# Patient Record
Sex: MALE | Race: WHITE | NOT HISPANIC OR LATINO | URBAN - METROPOLITAN AREA
[De-identification: names, ages, dates, MRNs, and addresses within clinical notes are randomized per-mention and may not be internally consistent; named-entity substitution may affect disease eponyms.]

---

## 2024-10-24 ENCOUNTER — EMERGENCY (EMERGENCY)
Facility: HOSPITAL | Age: 39
LOS: 1 days | Discharge: HOME | End: 2024-10-24
Admitting: EMERGENCY MEDICINE
Payer: SELF-PAY

## 2024-10-24 VITALS
OXYGEN SATURATION: 97 % | HEART RATE: 110 BPM | RESPIRATION RATE: 18 BRPM | SYSTOLIC BLOOD PRESSURE: 134 MMHG | WEIGHT: 179.9 LBS | TEMPERATURE: 97 F | HEIGHT: 69 IN | DIASTOLIC BLOOD PRESSURE: 93 MMHG

## 2024-10-24 VITALS — HEART RATE: 88 BPM

## 2024-10-24 PROCEDURE — 12002 RPR S/N/AX/GEN/TRNK2.6-7.5CM: CPT

## 2024-10-24 PROCEDURE — 99283 EMERGENCY DEPT VISIT LOW MDM: CPT | Mod: 25

## 2024-10-24 PROCEDURE — 99284 EMERGENCY DEPT VISIT MOD MDM: CPT | Mod: 25

## 2024-10-24 PROCEDURE — 90715 TDAP VACCINE 7 YRS/> IM: CPT

## 2024-10-24 PROCEDURE — 90471 IMMUNIZATION ADMIN: CPT

## 2024-10-24 RX ORDER — TETANUS TOXOID, REDUCED DIPHTHERIA TOXOID AND ACELLULAR PERTUSSIS VACCINE, ADSORBED 5; 2.5; 8; 8; 2.5 [IU]/.5ML; [IU]/.5ML; UG/.5ML; UG/.5ML; UG/.5ML
0.5 SUSPENSION INTRAMUSCULAR ONCE
Refills: 0 | Status: COMPLETED | OUTPATIENT
Start: 2024-10-24 | End: 2024-10-24

## 2024-10-24 RX ADMIN — TETANUS TOXOID, REDUCED DIPHTHERIA TOXOID AND ACELLULAR PERTUSSIS VACCINE, ADSORBED 0.5 MILLILITER(S): 5; 2.5; 8; 8; 2.5 SUSPENSION INTRAMUSCULAR at 16:14

## 2024-10-24 NOTE — ED PROVIDER NOTE - CCCP TRG CHIEF CMPLNT
A (CATHETER US SOUNDSTAR 8FR 90CM 3D TIP OVRLY GRN) catheter was inserted at the right atrium. lacerations

## 2024-10-24 NOTE — ED PROVIDER NOTE - CLINICAL SUMMARY MEDICAL DECISION MAKING FREE TEXT BOX
40 yo M with no pmh c/o R forearm laceration from a mirror. 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 Denies numbness, tingling. unknown last tetanus . 40 yo M with no pmh c/o R forearm laceration from a mirror. 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 Denies numbness, tingling. R forearm- 3cm lac to distal volar forearm, tendons intact, NV intact. tetanus updated. lac repaired 40 yo M with no pmh c/o R forearm laceration from a mirror. 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 Denies numbness, tingling. R forearm- 3cm lac to distal volar forearm, tendons intact, NV intact. tetanus updated. lac repaired and tetanus updated

## 2024-10-24 NOTE — ED ADULT NURSE NOTE - NSFALLUNIVINTERV_ED_ALL_ED
Bed/Stretcher in lowest position, wheels locked, appropriate side rails in place/Call bell, personal items and telephone in reach/Instruct patient to call for assistance before getting out of bed/chair/stretcher/Non-slip footwear applied when patient is off stretcher/[place] to call system/Physically safe environment - no spills, clutter or unnecessary equipment/Purposeful proactive rounding/Room/bathroom lighting operational, light cord in reach

## 2024-10-24 NOTE — ED PROVIDER NOTE - NSFOLLOWUPINSTRUCTIONS_ED_ALL_ED_FT
Please keep wound clean and dry for 24 hours. Return in 10-14 days for suture removal. Return to ED sooner for worsening fever, chills, swelling, redness, warmth, drainage or any other concerning symptoms.   Laceration    A laceration is a cut that goes through all of the layers of the skin and into the tissue that is right under the skin. Some lacerations heal on their own. Others need to be closed with skin adhesive strips, skin glue, stitches (sutures), or staples. Proper laceration care minimizes the risk of infection and helps the laceration to heal better.  If non-absorbable stitches or staples have been placed, they must be taken out within the time frame instructed by your healthcare provider.    SEEK IMMEDIATE MEDICAL CARE IF YOU HAVE ANY OF THE FOLLOWING SYMPTOMS: swelling around the wound, worsening pain, drainage from the wound, red streaking going away from your wound, inability to move finger or toe near the laceration, or discoloration of skin near the laceration.

## 2024-10-24 NOTE — ED PROVIDER NOTE - PHYSICAL EXAMINATION
CONSTITUTIONAL: Well-appearing;  in no apparent distress.   HEAD: Normocephalic; atraumatic.   SKIN: Normal for age and race; warm; dry; good turgor; no apparent lesions or rash. CONSTITUTIONAL: Well-appearing;  in no apparent distress.   HEAD: Normocephalic; atraumatic.   SKIN: R forearm- 3cm lac to distal volar forearm, tendons intact, NV intact

## 2024-10-24 NOTE — ED PROVIDER NOTE - PATIENT PORTAL LINK FT
You can access the FollowMyHealth Patient Portal offered by [facility] by registering at the following website: [email or web address]. By joining [email or web address]’s FollowMyHealth portal, you will also be able to view your health information using other applications (apps) compatible with our system.

## 2024-10-24 NOTE — ED PROVIDER NOTE - OBJECTIVE STATEMENT
40 yo M with no pmh c/o R forearm laceration from a mirror. 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 Denies numbness, tingling. unknown last tetanus .

## 2024-10-24 NOTE — ED ADULT NURSE NOTE - OBJECTIVE STATEMENT
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 Denies any other complaints including other injuries/trauma, pain to wrist joint or hand, n/t. Is unsure of last tetanus shot.  On exam, pt in NAD, respirations even and unlabored. Approx 3cm lac to medial wrist, no active bleeding; no significant tenderness or erythema surrounding area.  Wound rinsed and cleaned with NS and covered with petroleum gauze while waiting for provider assessment.

## 2024-10-28 DIAGNOSIS — Z23 ENCOUNTER FOR IMMUNIZATION: ICD-10-CM

## 2024-10-28 DIAGNOSIS — S51.811A LACERATION WITHOUT FOREIGN BODY OF RIGHT FOREARM, INITIAL ENCOUNTER: ICD-10-CM
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
## 2024-11-08 ENCOUNTER — EMERGENCY (EMERGENCY)
Facility: HOSPITAL | Age: 39
LOS: 1 days | Discharge: HOME | End: 2024-11-08
Attending: EMERGENCY MEDICINE | Admitting: EMERGENCY MEDICINE
Payer: SELF-PAY

## 2024-11-08 VITALS
RESPIRATION RATE: 18 BRPM | SYSTOLIC BLOOD PRESSURE: 124 MMHG | HEART RATE: 107 BPM | WEIGHT: 179.9 LBS | DIASTOLIC BLOOD PRESSURE: 86 MMHG | TEMPERATURE: 98 F | OXYGEN SATURATION: 96 % | HEIGHT: 69 IN

## 2024-11-08 PROCEDURE — 99212 OFFICE O/P EST SF 10 MIN: CPT

## 2024-11-08 PROCEDURE — L9995: CPT

## 2024-11-08 NOTE — ED PROVIDER NOTE - PATIENT PORTAL LINK FT
You can access the FollowMyHealth Patient Portal offered by [facility] by registering at the following website: [email or web address]. By joining [facility]’s FollowMyHealth portal, you will also be able to view your health information using other applications (apps) compatible with our system.

## 2024-11-11 DIAGNOSIS — S51.811D LACERATION WITHOUT FOREIGN BODY OF RIGHT FOREARM, SUBSEQUENT ENCOUNTER: ICD-10-CM
